# Patient Record
Sex: FEMALE | Race: WHITE
[De-identification: names, ages, dates, MRNs, and addresses within clinical notes are randomized per-mention and may not be internally consistent; named-entity substitution may affect disease eponyms.]

---

## 2018-01-24 ENCOUNTER — HOSPITAL ENCOUNTER (OUTPATIENT)
Dept: HOSPITAL 80 - FSGY | Age: 43
Setting detail: OBSERVATION
Discharge: HOME | End: 2018-01-24
Attending: OBSTETRICS & GYNECOLOGY | Admitting: OBSTETRICS & GYNECOLOGY
Payer: COMMERCIAL

## 2018-01-24 VITALS — HEART RATE: 67 BPM

## 2018-01-24 VITALS — SYSTOLIC BLOOD PRESSURE: 116 MMHG | OXYGEN SATURATION: 95 % | DIASTOLIC BLOOD PRESSURE: 71 MMHG

## 2018-01-24 VITALS — TEMPERATURE: 97.5 F

## 2018-01-24 VITALS — RESPIRATION RATE: 11 BRPM

## 2018-01-24 DIAGNOSIS — N92.0: ICD-10-CM

## 2018-01-24 DIAGNOSIS — Z87.440: ICD-10-CM

## 2018-01-24 DIAGNOSIS — F32.9: ICD-10-CM

## 2018-01-24 DIAGNOSIS — D25.0: Primary | ICD-10-CM

## 2018-01-24 DIAGNOSIS — F41.9: ICD-10-CM

## 2018-01-24 DIAGNOSIS — D25.1: ICD-10-CM

## 2018-01-24 PROCEDURE — C1765 ADHESION BARRIER: HCPCS

## 2018-01-24 PROCEDURE — 0UB94ZX EXCISION OF UTERUS, PERCUTANEOUS ENDOSCOPIC APPROACH, DIAGNOSTIC: ICD-10-PCS | Performed by: OBSTETRICS & GYNECOLOGY

## 2018-01-24 PROCEDURE — 0DBP4ZX EXCISION OF RECTUM, PERCUTANEOUS ENDOSCOPIC APPROACH, DIAGNOSTIC: ICD-10-PCS | Performed by: OBSTETRICS & GYNECOLOGY

## 2018-01-24 PROCEDURE — 58662 LAPAROSCOPY EXCISE LESIONS: CPT

## 2018-01-24 PROCEDURE — 0UBF4ZX EXCISION OF CUL-DE-SAC, PERCUTANEOUS ENDOSCOPIC APPROACH, DIAGNOSTIC: ICD-10-PCS | Performed by: OBSTETRICS & GYNECOLOGY

## 2018-01-24 RX ADMIN — HYDROMORPHONE HYDROCHLORIDE PRN MG: 1 INJECTION, SOLUTION INTRAMUSCULAR; INTRAVENOUS; SUBCUTANEOUS at 14:35

## 2018-01-24 RX ADMIN — HYDROMORPHONE HYDROCHLORIDE PRN MG: 1 INJECTION, SOLUTION INTRAMUSCULAR; INTRAVENOUS; SUBCUTANEOUS at 14:51

## 2018-01-24 RX ADMIN — HYDROMORPHONE HYDROCHLORIDE PRN MG: 1 INJECTION, SOLUTION INTRAMUSCULAR; INTRAVENOUS; SUBCUTANEOUS at 14:20

## 2018-01-24 RX ADMIN — Medication PRN MCG: at 14:17

## 2018-01-24 RX ADMIN — Medication PRN MCG: at 14:27

## 2018-01-24 NOTE — PDGENHP
History and Physical


History and Physical: 





Assessment and Plan:


1. Leiomyoma of uterus


- AMBULATORY REFERRAL TO OBSTETRICS / GYNECOLOGY





2. Intramural leiomyoma of uterus


Leonor has 2 intramural uterine fibroids.  She has failed medical management.  

As result she is to schedule myomectomy.  I think this can be accomplished 

minimally invasively.  She will return for a preoperative appointment.





3. Menorrhagia with regular cycle





Subjective:





Patient ID: Leonor Andersen is a 42 y.o. female who [facility] to [facility] 

[facility] for fibroids.





HPI 





Leonor Andersen presents for a preoperative visit. She is scheduled for a 

robotic myomectomy. The risks, benefits, and alternatives were presented and 

informed consent was obtained.  40 minutes of this 40 minute appointment was 

spent counceling, reviewing the procedure in detail, and discussing the 

preoperative and postoperative instructions. Below is a copy of our prior visit 

note.





Is a 42-year-old  0 woman kindly referred by Lori Herring for uterine 

fibroids.  Menstrual flow has become progressively heavier.  They occur every 

23 days.  They used to last for 4 days with 2 heavy days having to change her 

protection every 3-4 hours.  Now she has to change her protection every hour.  

She has had very prolonged bleeding sometimes up to 3 months in duration.  This 

is despite being on a birth control pill.  She passes large clots often has 

accidents and has associated cramping.  She is extremely tired of the bleeding 

and how it interferes with her life.





She is single and not sexually active.  And she was sexually active she did 

have deep dyspareunia.  Although she is 42 years old she would like maintain 

the option of having children.





PastMedicalHistory


Past Medical History:


Diagnosis   Date


   Anemia   


   Depression   


   STD (sexually transmitted disease)   


   Herpes


   Substance abuse   


   Alcohol


   Trauma   


   Urinary tract infection   


   Varicella   








PastSurgicalHistory


Past Surgical History:


Procedure   Laterality   Date


   BREAST SURGERY      











CURRENT MEDICATIONS: 


Current Outpatient Prescriptions


Medication   Sig


   lisdexamfetamine (VYVANSE) 70 mg capsule   Take 70 mg by mouth every morning.


   methocarbamol (ROBAXIN) 500 mg tablet   Take 500 mg by mouth daily.


   norethindrone-mestranol (NECON , 28,) 1-50 mg-mcg per tablet   Take 1 

tablet by mouth daily.


   oxyCODONE-acetaminophen (PERCOCET) 7.5-325 mg tab   Take 1 tablet by mouth 

every 6 hours as needed for Pain.


   SENNOSIDES/DOCUSATE SODIUM (SENNA-S PO)   


   UNABLE TO FIND   Med Name: Penicillin 500mg


   baclofen (LIORESAL) 10 mg tablet   Take 1 tablet by mouth 3 times daily.





No current facility-administered medications for this visit. 








ALLERGIES: No known drug allergies





I have reviewed, verified and agree with the past medical, surgical, birth, 

family, social  and ROS history as documented by the RN today.





Objective:


Vital Signs: 


Visit Vitals


BP   120/70


Pulse   74


Temp   36.9 C (98.4 F) (Temporal Artery)


Resp   16


Ht   1.702 m (5' 7")


Wt   59.1 kg (130 lb 3.2 oz)


SpO2   98%


BMI   20.39 kg/m








Physical Exam


Gen: This is an alert, well developed woman in no distress.


Neuro: She moves all extremities.


Psych: She is appropriate, oriented, with normal affect.


Neck: No thyroid enlargement, adenopathy, or tenderness.


Lungs: Clear to ascultation, no wheezes or rales.


Heart: Regular rate and rhythm without obvious murmurs.


Abdomen: Soft, non-tender, without guarding, rebound, or masses.


Extremities: No edema or cyanosis.


Pelvic: Normal external genitalia. Non-gaping introitus, vagina without 

discharge, adequately estrogenized, no significant prolapse. Cervix without 

lesions or discharge. Uterus mildly enlarged and irregularly shaped consistent 

with fibroids, normal sized, mobile, non-tender. Adnexa non-tender without 

enlargement.





DATA:


I have reviewed the pertinent medical records. 


PELVIC ULTRASOUND








Indication: Uterine fibroids.





Findings: 





Uterus is mid position and measures 7.7 x 9.3 x 9.7 cm.  She has two intramural 

fibroids.  One appears to encroach on the endometrial cavity measuring 3.5 cm 

in diameter.  There is a posterior fibroid which is somewhat sub-serosal 

measuring 4.0 cm.  The right ovary measures 3.1 x 2.2 cm.  The left ovary is 

not well-visualized.





Impression: 





Two uterine fibroids, one with a submucosal component and the other more 

subserosal. Both measuring about 4 cm.





  


TIME/COMMUNICATION:


I personally spent a total of 50 minutes. Of that 30 minutes was counseling/

coordination of patient's care. See my note above for details.





Billy Chaudhry MD


Board Certified Female Pelvic Medicine and Reconstructive Surgery


Director of Minimally Invasive Gynecologic Surgery, [facility]


[facility] Surgeon in Minimally Invasive Gynecologic Surgery


[facility] Surgeon in Robotic Surgery

## 2018-01-24 NOTE — PDANEPAE
ANE Past Medical History





- Cardiovascular History


Hx Hypertension: No


Hx Arrhythmias: No


Hx Chest Pain: No


Hx Coronary Artery / Peripheral Vascular Disease: No


Hx CHF / Valvular Disease: No


Hx Palpitations: Yes





- Pulmonary History


Hx COPD: No


Hx Asthma/Reactive Airway Disease: No


Hx Recent Upper Respiratory Infection: No


Hx Oxygen in Use at Home: No


Hx Sleep Apnea: No


Sleep Apnea Screening Result - Last Documented: Negative





- Neurologic History


Hx Cerebrovascular Accident: No


Hx Seizures: No


Hx Dementia: No





- Endocrine History


Hx Diabetes: No


Hypothyroid: No


Hyperthyroid: No


Obesity: no





- Renal History


Hx Renal Disorders: No





- Liver History


Hx Hepatic Disorders: No





- Neurological & Psychiatric Hx


Hx Neurological and Psychiatric Disorders: No





- Cancer History


Hx Cancer: No





- Congenital Disorder History


Hx Congenital Disorders: No





- GI History


GERD: no


Hx Gastrointestinal Disorders: No





- Other Health History


Other Health History: uterine fibroids.  Strep B in vagina





- Surgical History


Prior Surgeries: breast augmentation 2008





ANE Review of Systems


Review of systems is: negative


Review of Systems: 








- Exercise capacity


METS (RN): 4 METS





ANE Patient History





- Allergies


Allergies/Adverse Reactions: 








No Known Allergies Allergy (Unverified 01/17/18 13:46)


 








- Home Medications


Home medications: home medication list seen and reviewed


Home Medications: 








Herbals/Supplements -Info Only  01/17/18 [Last Taken 01/21/18]


Methocarbamol 500 01/17/18 [Last Taken 01/24/18]


Metronidazole  01/17/18 [Last Taken 01/21/18]


Norethindrone-Ethinyl Estrad [Necon 0.5-35-28 Tablet] 1 each PO 01/17/18 [Last 

Taken 01/24/18]


Oxycodone HCl/Acetaminophen  01/17/18 [Last Taken 01/21/18]


Penicillin  01/17/18 [Last Taken 01/21/18]


Senokot  01/17/18 [Last Taken 01/21/18]


Vyvanse 70 01/17/18 [Last Taken 01/21/18]








- NPO status


NPO Since - Liquids (Date): 01/24/18


NPO Since - Liquids (Time): 07:45


NPO Since - Solids (Date): 01/23/18


NPO Since - Solids (Time): 20:30





- Anes Hx


Anes Hx: no prior problems





- Smoking Hx


Smoking Status: Heavy smoker





- Family Anes Hx


Family Hx Anesthesia Complications: none





ANE Labs/Vital Signs





- Vital Signs


Blood Pressure: 112/85


Heart Rate: 67


Respiratory Rate: 12


O2 Sat (%): 99


Height: 170.18 cm


Weight: 58.967 kg





ANE Physical Exam





- Airway


Neck exam: FROM


Mallampati Score: Class 1


Mouth exam: normal dental/mouth exam





- Pulmonary


Pulmonary: no respiratory distress





- Cardiovascular


Cardiovascular: regular rate and rhythym





- ASA Status


ASA Status: II





ANE Anesthesia Plan


Anesthesia Plan: general endotracheal anesthesia

## 2018-01-24 NOTE — GOP
[f rep st]



                                                                OPERATIVE REPORT





DATE OF OPERATION:  01/24/2018



SURGEON:  Billy Chaudhry MD



ASSISTANT:  Fay Cooper CFA



ANESTHESIA:  General.



PREOPERATIVE DIAGNOSIS:  

1.  Uterine fibroids.

2.  Menorrhagia.

3.  Dysmenorrhea.



POSTOPERATIVE DIAGNOSIS:  

1.  Uterine fibroids.

2.  Menorrhagia.

3.  Dysmenorrhea.

4.  Extensive endometriosis.



PROCEDURE PERFORMED:  

1.  Robotic-assisted laparoscopic myomectomy.

2.  Bilateral ureterolysis.

3.  Excision of extensive endometriosis.

4.  Bilateral ovariopexy.

5.  Excision of rectal lesion.



FINDINGS:  



SPECIMENS:  

1.  Uterine fibroids.

1.  Pelvic peritoneum with endometriosis.



2.  ESTIMATED BLOOD LOSS:  50 mL.



DESCRIPTION OF PROCEDURE:  The patient was taken to the operating room, where she was identified.  Ge
neral anesthesia was administered and found to be adequate.  She was placed in the lithotomy position
, and prepared and draped in the normal sterile fashion.  A Hulka tenaculum was placed in the uterus 
for manipulation.  A Malik catheter was then placed. 



A 1 cm infraumbilical incision was made with a scalpel.  The Veress needle with CO2 gas flowing was a
dvanced into the peritoneal cavity.  The abdomen was then insufflated with carbon dioxide gas.  A 12 
mm trocar followed by a laparoscope were then inserted.  The upper abdomen was examined and was unrem
arkable.  There was endometriosis on the lateral lower abdominal walls.  There was extensive endometr
iosis throughout the posterior cul-de-sac and the bilateral ovarian fossas, as well as the anterior c
ul-de-sac.  The decision was made to first excise the endometriosis before the fibroids.  Because of 
the extensive endometriosis in the ovarian fossas, a bilateral ureterolysis was required.  The perito
neum at the pelvic brim was incised.  The ureters were gently dissected free and lateralized from the
 pelvic brim, all the way down to the bladder.  Once this was accomplished, the entire ovarian fossa 
peritoneum with overlying endometriosis was completely excised.  The posterior cul-de-sac peritoneum 
with endometriosis was also completely excised from the distal rectum up to the cervix.  There were l
esions on the distal rectum which required excision.  This extended approximately 50% into the muscul
keaton.  The lesions on the lower lateral abdominal wall and anterior cul-de-sac were then excised.  A 
bilateral ovariopexy was performed by attaching the ovaries to the ipsilateral round ligaments near t
he internal inguinal rings with 3-0 Vicryl Rapide suture. 



The junction of the fibroid to the myometrium was injected with a dilute solution of vasopressin.  An
 incision was made with hot leonardo.  The more superficial fibroid was first completely excised and pl
aced in the posterior cul-de-sac.  I then removed the submucous fibroid.  The uterus was then closed 
in layers with 2-0 V-Loc suture.  The uterus was hemostatic.  The robot was then undocked.  The speci
mens were removed through the umbilicus.  Interceed was placed, both on the uterus over the uterine i
ncision as well as the posterior cul-de-sac.  The fascia was closed with 0 Vicryl and the skin with 4
-0 Monocryl and surgical adhesive.  Anesthesia was reversed.  The patient was taken the PACU awake an
d in stable condition.



COMPLICATIONS:  None.



DISPOSITION:  Patient stable to PACU.





Job #:  [id number]

## 2018-01-24 NOTE — POSTOPPROG
Post Op Note


Date of Operation: 01/24/18


Surgeon: Billy Chaudhry


Assistant: Fay Cooper


Anesthesia: GET(General Endotracheal)


Pre-op Diagnosis: fibroids


Post-op Diagnosis: same plus endometriosis


Procedure: Robotic myomectomy, excision of endo, bilat ureterolysis


Inf/Abcess present in the surg proc area at time of surgery?: No


EBL: 


Complications: 





none

## 2018-01-24 NOTE — GOP
[f 
rep st]



                                                                OPERATIVE REPORT





DATE OF OPERATION:  01/24/2018



SURGEON:  Billy Chaudhry MD



I was interrupted at the begining of this dictation and had to hang up. Please 
see the full dictated operative note.





Job #:  [id number]

MTDD

## 2018-01-26 ENCOUNTER — HOSPITAL ENCOUNTER (EMERGENCY)
Dept: HOSPITAL 80 - FED | Age: 43
Discharge: HOME | End: 2018-01-26
Payer: COMMERCIAL

## 2018-01-26 VITALS — RESPIRATION RATE: 16 BRPM

## 2018-01-26 VITALS
HEART RATE: 65 BPM | TEMPERATURE: 98.6 F | OXYGEN SATURATION: 97 % | SYSTOLIC BLOOD PRESSURE: 108 MMHG | DIASTOLIC BLOOD PRESSURE: 76 MMHG

## 2018-01-26 DIAGNOSIS — R10.84: ICD-10-CM

## 2018-01-26 DIAGNOSIS — R11.10: ICD-10-CM

## 2018-01-26 DIAGNOSIS — F17.200: ICD-10-CM

## 2018-01-26 DIAGNOSIS — G89.18: Primary | ICD-10-CM

## 2018-01-26 DIAGNOSIS — E86.9: ICD-10-CM

## 2018-01-26 LAB — PLATELET # BLD: 318 10^3/UL (ref 150–400)

## 2018-01-26 NOTE — EDPHY
H & P


Stated Complaint: s/p FIBROID REMOVAL.  n/v


Time Seen by Provider: 01/26/18 11:16


HPI/ROS: 





CHIEF COMPLAINT:  Nausea, vomiting, abdominal pain





HISTORY OF PRESENT ILLNESS:  The patient presents the ED with a 1 day history 

of nausea, vomiting, abdominal distention and constipation.  She is status post 

surgery for fibroids and endometriosis removal.  The patient reports she has 

been unable to keep her pain medications down.  She has been using stool 

softeners and his had only a very small bowel movement once.  The patient 

denies any fever.  Prior to her surgery her chief complaint had been abdominal 

cramping and bleeding for the past several months.  The patient's gynecologist 

is Dr. Billy Chaudhry.  The patient takes no regular medications.  She denies any 

additional symptoms past medical history.





REVIEW OF SYSTEMS:


A comprehensive 10 point review of systems is otherwise negative aside from 

elements mentioned in the history of present illness.


Source: Patient


Exam Limitations: No limitations





- Personal History


LMP (Females 10-55): 1-7 Days Ago


Current Tetanus/Diphtheria Vaccine: Yes


Current Tetanus Diphtheria and Acellular Pertussis (TDAP): Yes





- Medical/Surgical History


Hx Diabetes: No


Other PMH: Negative





- Social History


Smoking Status: Heavy smoker





- Physical Exam


Exam: 





General Appearance:  Alert, no distress


Eyes:  Pupils equal and round no pallor or injection


ENT, Mouth:  Mucous membranes moist


Respiratory:  There are no retractions, lungs are clear to auscultation


Cardiovascular:  Regular rate and rhythm


Gastrointestinal:  Slightly distended, subcutaneous emphysema noted, mild 

diffuse tenderness, mild ecchymoses


Neurological:  A&O, normal motor function, normal sensory exam, normal cranial 

nerves


Skin:  Warm and dry, no rashes


Musculoskeletal:  Neck is supple nontender


Extremities:  symmetrical, full range of motion








Constitutional: 


 Initial Vital Signs











Temperature (C)  36.4 C   01/26/18 11:09


 


Heart Rate  98   01/26/18 11:09


 


Respiratory Rate  16   01/26/18 11:09


 


Blood Pressure  108/77   01/26/18 11:09


 


O2 Sat (%)  97   01/26/18 11:09








 











O2 Delivery Mode               Room Air














Allergies/Adverse Reactions: 


 





No Known Allergies Allergy (Unverified 01/17/18 13:46)


 








Home Medications: 














 Medication  Instructions  Recorded


 


Herbals/Supplements -Info Only  01/17/18


 


Methocarbamol 500 01/17/18


 


Metronidazole  01/17/18


 


Norethindrone-Ethinyl Estrad 1 each PO 01/17/18





[Necon 0.5-35-28 Tablet]  


 


Oxycodone HCl/Acetaminophen  01/17/18


 


Penicillin  01/17/18


 


Senokot  01/17/18


 


Vyvanse 70 01/17/18


 


Baclofen [Baclofen 10 mg (*)] 10 mg PO TID 01/24/18


 


oxyCODONE HCL/ACETAMINOPHEN 1 each PO Q4H PRN 01/24/18





[Percocet 7.5-325 mg Tablet]  


 


oxyCODONE/APAP 5/325 [Percocet 1 - 2 tab PO Q4HRS PRN #30 tab 01/24/18





5/325 (*)]  














Medical Decision Making





- Diagnostics


Imaging Results: 


 Imaging Impressions





Abdomen X-Ray  01/26/18 11:29


Impression: No evidence for ileus or severe constipation. Postoperative 

findings are described above.


 


Results discussed in detail with Rajesh Hill at 12:15 PM.


 











ED Course/Re-evaluation: 





The patient presents to the ED with postoperative nausea and vomiting.  The 

patient does have free air noted on her x-ray consistent with her recent 

surgery.  She does not have peritonitis clinically.  She does have some 

subcutaneous emphysema likely from her surgery.  The patient had an IV 

established.  She received IV Zofran, 0.5 mg of Dilaudid in 2 L of normal 

saline.





The patient's abdominal x-ray demonstrates no evidence of an obstruction or 

obvious constipation.





I re-evaluated the patient at 2:45 p.m. and she is feeling much better.  I do 

not feel that imaging is indicated at this point time.  The patient would like 

a prescription for Zofran.  She is comfortable being discharged home and has 

been given instructions to return to the ED immediately for any worsening pain, 

fever, bleeding or other concerns.








Differential Diagnosis: 





Differential diagnosis considered includes constipation, obstruction, 

perforation, peritonitis, ileus





- Data Points


Laboratory Results: 


 Laboratory Results





 01/26/18 11:32 





 01/26/18 11:32 





 











  01/26/18 01/26/18





  11:32 11:32


 


WBC    11.64 10^3/uL H 10^3/uL





    (3.80-9.50) 


 


RBC    3.69 10^6/uL L 10^6/uL





    (4.18-5.33) 


 


Hgb    12.1 g/dL L g/dL





    (12.6-16.3) 


 


Hct    35.1 % L %





    (38.0-47.0) 


 


MCV    95.1 fL fL





    (81.5-99.8) 


 


MCH    32.8 pg pg





    (27.9-34.1) 


 


MCHC    34.5 g/dL g/dL





    (32.4-36.7) 


 


RDW    12.5 % %





    (11.5-15.2) 


 


Plt Count    318 10^3/uL 10^3/uL





    (150-400) 


 


MPV    8.8 fL fL





    (8.7-11.7) 


 


Neut % (Auto)    88.4 % H %





    (39.3-74.2) 


 


Lymph % (Auto)    4.6 % L %





    (15.0-45.0) 


 


Mono % (Auto)    5.8 % %





    (4.5-13.0) 


 


Eos % (Auto)    0.5 % L %





    (0.6-7.6) 


 


Baso % (Auto)    0.3 % %





    (0.3-1.7) 


 


Nucleat RBC Rel Count    0.0 % %





    (0.0-0.2) 


 


Absolute Neuts (auto)    10.28 10^3/uL H 10^3/uL





    (1.70-6.50) 


 


Absolute Lymphs (auto)    0.54 10^3/uL L 10^3/uL





    (1.00-3.00) 


 


Absolute Monos (auto)    0.68 10^3/uL 10^3/uL





    (0.30-0.80) 


 


Absolute Eos (auto)    0.06 10^3/uL 10^3/uL





    (0.03-0.40) 


 


Absolute Basos (auto)    0.03 10^3/uL 10^3/uL





    (0.02-0.10) 


 


Absolute Nucleated RBC    0.00 10^3/uL 10^3/uL





    (0-0.01) 


 


Immature Gran %    0.4 % %





    (0.0-1.1) 


 


Immature Gran #    0.05 10^3/uL 10^3/uL





    (0.00-0.10) 


 


Sodium  137 mEq/L mEq/L  





   (135-145)  


 


Potassium  4.2 mEq/L mEq/L  





   (3.5-5.2)  


 


Chloride  103 mEq/L mEq/L  





   ()  


 


Carbon Dioxide  22 mEq/l mEq/l  





   (22-31)  


 


Anion Gap  12 mEq/L mEq/L  





   (8-16)  


 


BUN  7 mg/dL mg/dL  





   (7-23)  


 


Creatinine  0.7 mg/dL mg/dL  





   (0.6-1.0)  


 


Estimated GFR  > 60   





   


 


Glucose  92 mg/dL mg/dL  





   ()  


 


Calcium  9.0 mg/dL mg/dL  





   (8.5-10.4)  











Medications Given: 


 








Discontinued Medications





Hydromorphone HCl (Dilaudid)  0.5 mg IVP EDNOW ONE


   Stop: 01/26/18 11:29


   Last Admin: 01/26/18 12:04 Dose:  0.5 mg


Sodium Chloride (Ns)  1,000 mls @ 0 mls/hr IV EDNOW ONE; Wide Open


   PRN Reason: Protocol


   Stop: 01/26/18 11:29


   Last Admin: 01/26/18 12:01 Dose:  1,000 mls


Ondansetron HCl (Zofran)  4 mg IVP EDNOW ONE


   Stop: 01/26/18 11:29


   Last Admin: 01/26/18 12:04 Dose:  4 mg








Departure





- Departure


Disposition: Home, Routine, Self-Care


Clinical Impression: 


 Postoperative vomiting, Abdominal pain





Condition: Good


Instructions:  Acute Abdominal Pain (ED)


Additional Instructions: 


1.  Zofran as needed for nausea.


2.  Pain medications as prescribed by Dr. Chaudhry.


3.  Return to the ED for any worsening abdominal pain, fever, vomiting or other 

concerns.


4.  Please follow up with Dr. Chaudhry as scheduled.


Referrals: 


Billy Chaudhry MD [Medical Doctor] - As per Instructions

## 2018-09-17 ENCOUNTER — HOSPITAL ENCOUNTER (OUTPATIENT)
Dept: HOSPITAL 80 - FSGY | Age: 43
Discharge: HOME | End: 2018-09-17
Payer: COMMERCIAL

## 2018-09-17 VITALS — SYSTOLIC BLOOD PRESSURE: 126 MMHG | DIASTOLIC BLOOD PRESSURE: 84 MMHG

## 2018-09-17 DIAGNOSIS — K44.9: Primary | ICD-10-CM

## 2018-09-17 DIAGNOSIS — R14.0: ICD-10-CM

## 2018-09-17 DIAGNOSIS — K59.00: ICD-10-CM

## 2018-09-17 DIAGNOSIS — R11.2: ICD-10-CM

## 2018-09-17 NOTE — GIREPORT
[facility]

Surgical Services - Endoscopy Department

_____________________________________________________________________________________________________
_______

Patient Name: Leonor Andersen                           Procedure Date: 9/17/2018 12:49 PM

MRN: [id number]                                       Account Number: [id number]

Patient Type: Outpatient                             Attending  MD/ ER Physician: Massimo Templeton MD

_____________________________________________________________________________________________________
_______

 

Procedure:                    Upper GI endoscopy

Indications:                  Dyspepsia, Nausea with vomiting

Patient Profile:              42 year old female presents for evaluation of dyspepsia, nausea, vomiti
ng, 

                              and abdominal discomfort.

Providers:                    Massimo Templeton MD

Medicines:                    Monitored Anesthesia Care

Complications:                No immediate complications. Estimated blood loss: Minimal.

Description of Procedure:     After obtaining informed consent, the endoscope was passed under direct
 

                              vision. Throughout the procedure, the patient's blood pressure, pulse, 
and 

                              oxygen saturations were monitored continuously. The Endoscope was intro
duced 

                              through the mouth, and advanced to the second part of duodenum. The [facility]
er GI 

                              endoscopy was accomplished without difficulty. The patient tolerated th
e 

                              procedure well.

Findings:                     The examined esophagus was normal.

                              A small hiatal hernia was present.

                              Patchy mildly erythematous mucosa was found in the gastric body and in 
the 

                              gastric antrum. Biopsies were taken with a cold forceps for histology.

                              The examined duodenum was normal. Biopsies for histology were taken wit
h a 

                              cold forceps for evaluation of celiac disease.

Estimated Blood Loss:         Estimated blood loss was minimal.

Post Op Diagnosis:            - Normal esophagus.

                              - Small hiatal hernia.

                              - Erythematous mucosa in the gastric body and antrum. Biopsied.

                              - Normal examined duodenum. Biopsied.

                              - Etiology? No obvious cause of symptoms seen. Await biopsy results.

Recommendation:               - Perform a colonoscopy today.

                              - More recommendations on colonoscopy report.

                              - Await pathology results.

                              - Return to GI office in 4 weeks.

                              - Thank you for allowing me to participate in the care of your patient.


Attending Participation:

     I personally performed the entire procedure.

 

Massimo Templeton MD

_____________

Massimo Templeton MD

9/17/2018 1:08:52 PM

This report has been signed electronicallyMassimo Templeton MD

Number of Addenda: 0

 

Note Initiated On: 9/17/2018 12:49 PM

[email or web address]

## 2018-09-17 NOTE — PDGENHP
History & Physical


Chief Complaint: n,v and abdominal discomfort.


History of Present Illness: 42 year old female presents for evaluation of 

significant bloating after eating. Also complaints of n and v. + lower quadrant 

abdominal pain.


Pertinent Past, Social, Family History: PMHx; Fibroids.  SoHx: + cigs


Relevant Physical Exam: HEENT: anicteric.  CV: RRR +s1s2.  Lungs: CTAB.  Abd: 

soft, nt, + BS


Cardiorespiratory Assessment: ASA 2

## 2018-09-17 NOTE — GIREPORT
[facility]

Surgical Services - Endoscopy Department

_____________________________________________________________________________________________________
_______

Patient Name: Leonor Andersen                           Procedure Date: 9/17/2018 1:06 PM

MRN: [id number]                                       Account Number: [id number]

Patient Type: Outpatient                             Attending  MD/ ER Physician: Massimo Templeton MD

_____________________________________________________________________________________________________
_______

 

Procedure:                    Colonoscopy

Indications:                  Lower abdominal pain, Constipation

Patient Profile:              42 year old female presents for evaluation of lower quadrant abdominal 
pain 

                              and constipation.

Providers:                    Massimo Templeton MD

Medicines:                    Monitored Anesthesia Care

Complications:                No immediate complications. Estimated blood loss: None.

Description of Procedure:     After obtaining informed consent, the scope was passed under direct vis
ion. 

                              Throughout the procedure, the patient's blood pressure, pulse, and oxyg
en 

                              saturations were monitored continuously. The Colonoscope with irrigatio
n 

                              channel was introduced through the anus and advanced to the terminal il
eum. 

                              The colonoscopy was performed without difficulty. The patient tolerated
 the 

                              procedure well. The quality of the bowel preparation was good. The term
inal 

                              ileum, ileocecal valve, appendiceal orifice, and rectum were photograph
ed.

Findings:                     The perianal and digital rectal examinations were normal. Pertinent 

                              negatives include no palpable rectal lesions.

                              The entire examined colon appeared normal.

                              The terminal ileum appeared normal.

Estimated Blood Loss:         Estimated blood loss: none.

Post Op Diagnosis:            - The entire examined colon is normal.

                              - The examined portion of the ileum was normal.

                              - No specimens collected.

                              - Etiology? No obvious cause of symptoms seen. Await biopsy results on 
EGD. 

                              IBS?

Recommendation:               - Discharge patient to home (with escort).

                              - Resume previous diet.

                              - Continue present medications.

                              - Repeat colonoscopy in 10 years for screening purposes.

                              - Thank you for allowing me to participate in the care of your patient.


Attending Participation:

     I personally performed the entire procedure.

 

Massimo Templeton MD

_____________

Massimo Templeton MD

9/17/2018 1:37:27 PM

This report has been signed electronicallyMassimo Templeton MD

Number of Addenda: 0

 

Note Initiated On: 9/17/2018 1:06 PM

Total Procedure Duration Time 0 hours 15 minutes 45 seconds 

[email or web address]

## 2018-09-17 NOTE — PDANEPAE
ANE History of Present Illness





EGD/CScope, bloating, constipation





ANE Past Medical History





- Cardiovascular History


Hx Hypertension: No


Hx Arrhythmias: No


Hx Chest Pain: No


Hx Coronary Artery / Peripheral Vascular Disease: No


Hx CHF / Valvular Disease: No


Hx Palpitations: No





- Pulmonary History


Hx COPD: No


Hx Asthma/Reactive Airway Disease: No


Hx Recent Upper Respiratory Infection: No


Hx Oxygen in Use at Home: No


Hx Sleep Apnea: No


Sleep Apnea Screening Result - Last Documented: Negative


Pulmonary History Comment: smokes marijuana





- Neurologic History


Hx Cerebrovascular Accident: No


Hx Seizures: No


Hx Dementia: No





- Endocrine History


Hx Diabetes: No





- Renal History


Hx Renal Disorders: No





- Liver History


Hx Hepatic Disorders: No





- Neurological & Psychiatric Hx


Hx Neurological and Psychiatric Disorders: Yes


Neurological / Psychiatric History Comment: depression,anxiety.  cervical 

radiculopathy





- Cancer History


Hx Cancer: No





- Congenital Disorder History


Hx Congenital Disorders: No





- GI History


Hx Gastrointestinal Disorders: Yes


Gastrointestinal History Comment: constipation





- Other Health History


Other Health History: uterine fibroids.  Strep B in vagina.  nasal passages 

inflamed





- Chronic Pain History


Chronic Pain: Yes (right shoulder,wrist)





- Surgical History


Prior Surgeries: breast augmentation 2008.  fibroid and endometriosis 2018





ANE Review of Systems


Review of Systems: 








- Exercise capacity


METS (RN): 5 METS





ANE Patient History





- Allergies


Allergies/Adverse Reactions: 








No Known Allergies Allergy (Verified 08/22/18 11:25)


 








- Home Medications


Home Medications: 








Herbals/Supplements -Info Only  01/17/18 [Last Taken 09/10/18]


Methocarbamol  01/17/18 [Last Taken 09/16/18]


Senokot  01/17/18 [Last Taken 09/10/18]


Melatonin  08/22/18 [Last Taken 09/16/18]


Trazodone HCl  08/22/18 [Last Taken 09/15/18]


Xanax  08/22/18 [Last Taken 09/16/18]


buPROPion  08/22/18 [Last Taken 09/17/18]


oxyCODONE/APAP 5/325 [Percocet 5/325 (*)]  08/22/18 [Last Taken 09/10/18]








- NPO status


NPO Since - Liquids (Date): 09/17/18


NPO Since - Liquids (Time): 02:00


NPO Since - Solids (Date): 09/16/18


NPO Since - Solids (Time): 09:00





- Smoking Hx


Smoking Status: Heavy smoker





- Family Anes Hx


Family Hx Anesthesia Complications: none





ANE Labs/Vital Signs





- Vital Signs


Blood Pressure: 117/80


Heart Rate: 81


Respiratory Rate: 15


O2 Sat (%): 99


Height: 170.18 cm


Weight: 58.967 kg





ANE Physical Exam





- Airway


Neck exam: FROM


Mallampati Score: Class 1


Mouth exam: normal dental/mouth exam





- Pulmonary


Pulmonary: no respiratory distress, no rales or rhonchi, clear to auscultation





- Cardiovascular


Cardiovascular: regular rate and rhythym, no murmur, rub, or gallop





- ASA Status


ASA Status: II





ANE Anesthesia Plan


Anesthesia Plan: GA with mask

## 2018-12-04 ENCOUNTER — HOSPITAL ENCOUNTER (OUTPATIENT)
Dept: HOSPITAL 80 - FIMAGING | Age: 43
End: 2018-12-04
Attending: OBSTETRICS & GYNECOLOGY
Payer: COMMERCIAL

## 2018-12-04 DIAGNOSIS — N64.4: Primary | ICD-10-CM

## 2018-12-04 DIAGNOSIS — Z80.3: ICD-10-CM
